# Patient Record
Sex: MALE | Race: WHITE
[De-identification: names, ages, dates, MRNs, and addresses within clinical notes are randomized per-mention and may not be internally consistent; named-entity substitution may affect disease eponyms.]

---

## 2020-07-23 ENCOUNTER — HOSPITAL ENCOUNTER (EMERGENCY)
Dept: HOSPITAL 96 - M.ERS | Age: 20
LOS: 1 days | Discharge: HOME | End: 2020-07-24
Payer: COMMERCIAL

## 2020-07-23 VITALS — HEIGHT: 67 IN | WEIGHT: 189.99 LBS | BODY MASS INDEX: 29.82 KG/M2

## 2020-07-23 DIAGNOSIS — Z85.828: ICD-10-CM

## 2020-07-23 DIAGNOSIS — J45.909: ICD-10-CM

## 2020-07-23 DIAGNOSIS — K52.9: Primary | ICD-10-CM

## 2020-07-23 DIAGNOSIS — R11.2: ICD-10-CM

## 2020-07-23 LAB
ABSOLUTE BASOPHILS: 0.1 THOU/UL (ref 0–0.2)
ABSOLUTE EOSINOPHILS: 0.5 THOU/UL (ref 0–0.7)
ABSOLUTE MONOCYTES: 0.8 THOU/UL (ref 0–1.2)
ALBUMIN SERPL-MCNC: 4.2 G/DL (ref 3.4–5)
ALP SERPL-CCNC: 99 U/L (ref 46–116)
ALT SERPL-CCNC: 113 U/L (ref 30–65)
ANION GAP SERPL CALC-SCNC: 10 MMOL/L (ref 7–16)
AST SERPL-CCNC: 33 U/L (ref 15–37)
BASOPHILS NFR BLD AUTO: 1 %
BILIRUB SERPL-MCNC: 0.3 MG/DL
BILIRUB UR-MCNC: NEGATIVE MG/DL
BUN SERPL-MCNC: 13 MG/DL (ref 7–18)
CALCIUM SERPL-MCNC: 8.6 MG/DL (ref 8.5–10.1)
CHLORIDE SERPL-SCNC: 104 MMOL/L (ref 98–107)
CO2 SERPL-SCNC: 26 MMOL/L (ref 21–32)
COLOR UR: YELLOW
CREAT SERPL-MCNC: 1 MG/DL (ref 0.6–1.3)
EOSINOPHIL NFR BLD: 4.3 %
GLUCOSE SERPL-MCNC: 98 MG/DL (ref 70–99)
GRANULOCYTES NFR BLD MANUAL: 62.8 %
HCT VFR BLD CALC: 43.6 % (ref 42–52)
HGB BLD-MCNC: 15.2 GM/DL (ref 14–18)
KETONES UR STRIP-MCNC: NEGATIVE MG/DL
LIPASE: 92 U/L (ref 73–393)
LYMPHOCYTES # BLD: 2.7 THOU/UL (ref 0.8–5.3)
LYMPHOCYTES NFR BLD AUTO: 24.7 %
MAGNESIUM SERPL-MCNC: 2.2 MG/DL (ref 1.8–2.4)
MCH RBC QN AUTO: 30.5 PG (ref 26–34)
MCHC RBC AUTO-ENTMCNC: 35 G/DL (ref 28–37)
MCV RBC: 87.3 FL (ref 80–100)
MONOCYTES NFR BLD: 7.2 %
MPV: 8.1 FL. (ref 7.2–11.1)
NEUTROPHILS # BLD: 6.7 THOU/UL (ref 1.6–8.1)
NUCLEATED RBCS: 0 /100WBC
PLATELET COUNT*: 256 THOU/UL (ref 150–400)
POTASSIUM SERPL-SCNC: 3.5 MMOL/L (ref 3.5–5.1)
PROT SERPL-MCNC: 7.7 G/DL (ref 6.4–8.2)
PROT UR QL STRIP: NEGATIVE
RBC # BLD AUTO: 5 MIL/UL (ref 4.5–6)
RBC # UR STRIP: NEGATIVE /UL
RDW-CV: 12.4 % (ref 10.5–14.5)
SODIUM SERPL-SCNC: 140 MMOL/L (ref 136–145)
SP GR UR STRIP: >= 1.03 (ref 1–1.03)
URINE CLARITY: CLEAR
URINE GLUCOSE-RANDOM: NEGATIVE
URINE LEUKOCYTES-REFLEX: NEGATIVE
URINE NITRITE-REFLEX: NEGATIVE
UROBILINOGEN UR STRIP-ACNC: 0.2 E.U./DL (ref 0.2–1)
WBC # BLD AUTO: 10.7 THOU/UL (ref 4–11)

## 2020-07-24 VITALS — SYSTOLIC BLOOD PRESSURE: 124 MMHG | DIASTOLIC BLOOD PRESSURE: 71 MMHG

## 2020-07-28 ENCOUNTER — HOSPITAL ENCOUNTER (OUTPATIENT)
Dept: HOSPITAL 96 - M.SUR | Age: 20
Setting detail: OBSERVATION
LOS: 1 days | Discharge: HOME | End: 2020-07-29
Attending: SURGERY | Admitting: SURGERY
Payer: COMMERCIAL

## 2020-07-28 VITALS — DIASTOLIC BLOOD PRESSURE: 75 MMHG | SYSTOLIC BLOOD PRESSURE: 140 MMHG

## 2020-07-28 VITALS — HEIGHT: 67.99 IN | WEIGHT: 190 LBS | BODY MASS INDEX: 28.79 KG/M2

## 2020-07-28 VITALS — DIASTOLIC BLOOD PRESSURE: 78 MMHG | SYSTOLIC BLOOD PRESSURE: 128 MMHG

## 2020-07-28 DIAGNOSIS — K35.80: ICD-10-CM

## 2020-07-28 DIAGNOSIS — Z03.818: Primary | ICD-10-CM

## 2020-07-28 LAB
ABSOLUTE BASOPHILS: 0 THOU/UL (ref 0–0.2)
ABSOLUTE EOSINOPHILS: 0.4 THOU/UL (ref 0–0.7)
ABSOLUTE MONOCYTES: 0.6 THOU/UL (ref 0–1.2)
ALBUMIN SERPL-MCNC: 4.3 G/DL (ref 3.4–5)
ALP SERPL-CCNC: 103 U/L (ref 46–116)
ALT SERPL-CCNC: 100 U/L (ref 30–65)
ANION GAP SERPL CALC-SCNC: 8 MMOL/L (ref 7–16)
AST SERPL-CCNC: 29 U/L (ref 15–37)
BASOPHILS NFR BLD AUTO: 0.3 %
BILIRUB SERPL-MCNC: 0.3 MG/DL
BILIRUB UR-MCNC: NEGATIVE MG/DL
BUN SERPL-MCNC: 10 MG/DL (ref 7–18)
CALCIUM SERPL-MCNC: 8.8 MG/DL (ref 8.5–10.1)
CHLORIDE SERPL-SCNC: 105 MMOL/L (ref 98–107)
CO2 SERPL-SCNC: 28 MMOL/L (ref 21–32)
COLOR UR: YELLOW
CREAT SERPL-MCNC: 1 MG/DL (ref 0.6–1.3)
EOSINOPHIL NFR BLD: 3.6 %
GLUCOSE SERPL-MCNC: 124 MG/DL (ref 70–99)
GRANULOCYTES NFR BLD MANUAL: 69.4 %
HCT VFR BLD CALC: 43.7 % (ref 42–52)
HGB BLD-MCNC: 15.5 GM/DL (ref 14–18)
KETONES UR STRIP-MCNC: NEGATIVE MG/DL
LIPASE: 92 U/L (ref 73–393)
LYMPHOCYTES # BLD: 2 THOU/UL (ref 0.8–5.3)
LYMPHOCYTES NFR BLD AUTO: 20.2 %
MAGNESIUM SERPL-MCNC: 2.2 MG/DL (ref 1.8–2.4)
MCH RBC QN AUTO: 30.8 PG (ref 26–34)
MCHC RBC AUTO-ENTMCNC: 35.4 G/DL (ref 28–37)
MCV RBC: 87.1 FL (ref 80–100)
MONOCYTES NFR BLD: 6.5 %
MPV: 8.2 FL. (ref 7.2–11.1)
NEUTROPHILS # BLD: 6.8 THOU/UL (ref 1.6–8.1)
NUCLEATED RBCS: 0 /100WBC
PLATELET COUNT*: 255 THOU/UL (ref 150–400)
POTASSIUM SERPL-SCNC: 3.5 MMOL/L (ref 3.5–5.1)
PROT SERPL-MCNC: 7.8 G/DL (ref 6.4–8.2)
PROT UR QL STRIP: NEGATIVE
RBC # BLD AUTO: 5.02 MIL/UL (ref 4.5–6)
RBC # UR STRIP: NEGATIVE /UL
RDW-CV: 12.6 % (ref 10.5–14.5)
SODIUM SERPL-SCNC: 141 MMOL/L (ref 136–145)
SP GR UR STRIP: 1.02 (ref 1–1.03)
URINE CLARITY: CLEAR
URINE GLUCOSE-RANDOM: NEGATIVE
URINE LEUKOCYTES-REFLEX: NEGATIVE
URINE NITRITE-REFLEX: NEGATIVE
UROBILINOGEN UR STRIP-ACNC: 0.2 E.U./DL (ref 0.2–1)
WBC # BLD AUTO: 9.8 THOU/UL (ref 4–11)

## 2020-07-29 VITALS — SYSTOLIC BLOOD PRESSURE: 144 MMHG | DIASTOLIC BLOOD PRESSURE: 79 MMHG

## 2020-07-29 VITALS — DIASTOLIC BLOOD PRESSURE: 83 MMHG | SYSTOLIC BLOOD PRESSURE: 128 MMHG

## 2020-07-31 NOTE — PATH
[facility] 
[address] [place]  [address]                    PATHOLOGY RPT PROCEDURE       
_______________________________________________________________________________
 
Name:       GEE KAISER           Room:           [address] Nichole DAVIS#:  [id number]      Account #:      [id number]  
Admission:  07/29/20     Date of Birth:  [birth year]  
Discharge:  07/29/20                   Report #:    [id number]
                                                         Path Case #: [id number]
_______________________________________________________________________________
 
L[facility] Accession Number: [id number]
.                                                                01
Material submitted:                                        .
appendix - APPENDIX
.                                                                01
Clinical history:                                          .
appendicitis
.                                                                02
**********************************************************************
Diagnosis:
Vermiform appendix, excision:
 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
    inflammation.
 - Negative for malignancy.
.
(MLK:mml; 07/30/2020)
QL  07/31/2020  1107 Local
**********************************************************************
.                                                                02
Electronically signed:                                     .
Nichelle Benavidez MD, Pathologist
NPI- [id number]
.                                                                01
Gross description:                                         .
The specimen is received in formalin, labeled "Gee Kaiser",
"appendix".  Received is a vermiform appendix which measures 7.2 cm in
length and up to 1.1 cm in diameter.  The serosa surface is wrinkled,
shiny and pale tan-gray.  Sectioning reveals a partially patent lumen with
a dilated distal tip.  The tip is filled with a multicystic gelatinous
irregularity that measures 1.8 cm in size.  No distinct solid masses or
nodules are identified.  Representative sections are submitted in
cassettes A1 to A3, with the cystic area entirely submitted in cassettes
A2 and A3.(SNA; 7/29/2020)
WOODY/MARTHA  07/29/2020  1730 Local
.                                                                02
Pathologist provided ICD-10:
K35.80, K36
.                                                                02
CPT                                                        .
120039
Specimen Comment: A courtesy copy of this report has been sent to [phone]
Specimen Comment: Report sent to 
***Performed at:  01
   [address]  [id number]
   MD Nic Valentine MD Phone:  [phone]
***Performed at:  02
   [place] [address]                    PATHOLOGY RPT PROCEDURE       
_______________________________________________________________________________
 
Name:       GEE KAISER           Room:           [address] 
M.RRickey#:  [id number]      Account #:      [id number]  
Admission:  07/29/20     Date of Birth:  [birth year]  
Discharge:  07/29/20                   Report #:    [id number]
                                                         Path Case #: [id number]
_______________________________________________________________________________
   [address] [place]  [id number]
   MD Ronald Anand MD Phone:  [phone]

## 2020-08-01 NOTE — OP
[address]  [address]                    OPERATIVE REPORT              
_______________________________________________________________________________
 
Name:       SHYANN KAISER           Room:           [address] Nichole DAVIS#:  [id number]      Account #:      [id number]  
Admission:  07/29/20     Attend Phys:    Barrie Horn DO   
Discharge:  07/29/20     Date of Birth:  [birth year]  
         Report #: [id number]
                                                                     [id number]  
_______________________________________________________________________________
THIS REPORT FOR:  //name//                      
 
cc:  ДМИТРИЙ Arndt family physician/PCP 
     ДМИТРИЙ Arndt family physician/PCP                                        ~
THIS REPORT FOR:  //name//                      
 
CC: Barrie CHOE physician/PCP
 
DATE OF SERVICE:  07/28/2020
 
 
PREOPERATIVE DIAGNOSIS:  Acute appendicitis.
 
POSTOPERATIVE DIAGNOSIS:  Acute appendicitis.
 
PROCEDURE:  Laparoscopic appendectomy.
 
SURGEON:  Barrie Horn DO
 
ASSISTANT:  Roland Robert DO, PGY-1, resident.
 
ANESTHESIA:  General endotracheal.
 
ESTIMATED BLOOD LOSS:  Less than 20 mL.
 
COMPLICATIONS:  None.
 
DESCRIPTION OF PROCEDURE:  After obtaining proper consents and discussing risks
and complications with the patient, he was taken to the operating room, laid in
the supine position, administered general endotracheal anesthetic.  He was then
prepped and draped in the usual sterile fashion.  A timeout was performed.  We
confirmed the appropriate patient and procedure.  Preoperative antibiotics had
been given.  SCDs were in place.  We then made a small supraumbilical skin
incision with #11 scalpel blade.  This was carried down through the skin into
the subcutaneous tissue using electrocautery for hemostasis.  Once the fascia
was encountered, it was incised along the midline, grasped and elevated with
Kocher clamps and divided further.  The peritoneum was then bluntly opened using
a hemostat.  A finger was placed inside the peritoneal cavity to assure that
there were no yosvany-incisional adhesions.  Next, 2-0 Vicryl sutures were placed
in a figure-of-eight fashion to secure the Lilian trocar, which was then
inserted and insufflation was begun.  Once insufflation was complete, full
visual inspection of the anterior abdominal organs was performed.  This revealed
no significant gross abnormalities.  Immediately, there was a large amount of
omental fat.  We then placed the patient in Trendelenburg position.  A 5 mm
trocar was placed in the suprapubic position, a 12 mm trocar was placed in the
 
 
 
[place] [address]                    OPERATIVE REPORT              
_______________________________________________________________________________
 
Name:       SHYANN KAISER ALAN           [address]           [address]  [id number]      Account #:      [id number]  
Admission:  07/29/20     Attend Phys:    Barrie Horn DO   
Discharge:  07/29/20     Date of Birth:  [birth year]  
         Report #: [id number]
                                                                     [id number]  
_______________________________________________________________________________
left lower quadrant.  I was then able to identify the cecum and then identified
the appendix, which was noted to be markedly inflamed, especially at the distal
aspect, I used blunt dissection as well as the Harmonic scalpel to sequentially
clamp and divide the mesoappendix.  The appendix was then attached only at its
base.  Once it was attached only at its base and we could see the confluence of
the tenia coli, I used an Endo-STEPHEN 45 mm purple load to divide the base of the
appendix.  We then placed the appendix into an Endopouch.  I checked the
appendiceal stump and mesoappendiceal stump for any leak or bleeding, there was
none identified.  We then placed the patient back into the normal supine
position.  I removed the left lower quadrant trocar and the site was closed
using a PMI closure device with 0 Vicryl suture to close the fascia.  The
remaining trocars were then removed under direct vision and the insufflation was
all released.  We then closed the umbilical fascia using the 2 previously placed
0 Vicryl sutures plus 2 additional 0 Vicryl sutures.  I then closed the
subcutaneous tissue of the umbilical incision with a 3-0 Vicryl suture.  Skin
incisions were all closed using 4-0 Monocryl subcuticular stitches.  Dermabond
was placed.  The wounds were injected with 0.5% Marcaine without epinephrine. 
The patient was then awakened in the operating room and transported to recovery
room in stable condition.
 
 
 
 
 
 
 
 
 
 
 
 
 
 
 
 
 
 
 
 
 
 
 
 
 
<ELECTRONICALLY SIGNED>
                                        By:  Barrie Horn DO            
08/01/20     0937
D: 07/29/20 0121_______________________________________
T: 07/29/20 0142Anina Horn DO               /nt

## 2021-01-14 ENCOUNTER — HOSPITAL ENCOUNTER (EMERGENCY)
Dept: HOSPITAL 96 - M.ERS | Age: 21
Discharge: HOME | End: 2021-01-14
Payer: COMMERCIAL

## 2021-01-14 VITALS — WEIGHT: 200 LBS | BODY MASS INDEX: 30.31 KG/M2 | HEIGHT: 68 IN

## 2021-01-14 VITALS — DIASTOLIC BLOOD PRESSURE: 63 MMHG | SYSTOLIC BLOOD PRESSURE: 115 MMHG

## 2021-01-14 DIAGNOSIS — K52.9: Primary | ICD-10-CM

## 2021-01-14 DIAGNOSIS — J45.909: ICD-10-CM

## 2021-01-14 LAB
ABSOLUTE BASOPHILS: 0.1 THOU/UL (ref 0–0.2)
ABSOLUTE EOSINOPHILS: 0.4 THOU/UL (ref 0–0.7)
ABSOLUTE MONOCYTES: 0.9 THOU/UL (ref 0–1.2)
ALBUMIN SERPL-MCNC: 4.9 G/DL (ref 3.4–5)
ALP SERPL-CCNC: 102 U/L (ref 46–116)
ALT SERPL-CCNC: 129 U/L (ref 30–65)
ANION GAP SERPL CALC-SCNC: 12 MMOL/L (ref 7–16)
AST SERPL-CCNC: 31 U/L (ref 15–37)
BASOPHILS NFR BLD AUTO: 0.7 %
BILIRUB SERPL-MCNC: 0.4 MG/DL
BILIRUB UR-MCNC: NEGATIVE MG/DL
BUN SERPL-MCNC: 18 MG/DL (ref 7–18)
CALCIUM SERPL-MCNC: 9.3 MG/DL (ref 8.5–10.1)
CHLORIDE SERPL-SCNC: 104 MMOL/L (ref 98–107)
CO2 SERPL-SCNC: 25 MMOL/L (ref 21–32)
COLOR UR: YELLOW
CREAT SERPL-MCNC: 0.9 MG/DL (ref 0.6–1.3)
EOSINOPHIL NFR BLD: 3.5 %
GLUCOSE SERPL-MCNC: 89 MG/DL (ref 70–99)
GRANULOCYTES NFR BLD MANUAL: 69.2 %
HCT VFR BLD CALC: 47.2 % (ref 42–52)
HGB BLD-MCNC: 16.4 GM/DL (ref 14–18)
KETONES UR STRIP-MCNC: NEGATIVE MG/DL
LIPASE: 93 U/L (ref 73–393)
LYMPHOCYTES # BLD: 2 THOU/UL (ref 0.8–5.3)
LYMPHOCYTES NFR BLD AUTO: 18.7 %
MCH RBC QN AUTO: 30.2 PG (ref 26–34)
MCHC RBC AUTO-ENTMCNC: 34.9 G/DL (ref 28–37)
MCV RBC: 86.5 FL (ref 80–100)
MONOCYTES NFR BLD: 7.9 %
MPV: 8.1 FL. (ref 7.2–11.1)
NEUTROPHILS # BLD: 7.5 THOU/UL (ref 1.6–8.1)
NUCLEATED RBCS: 0 /100WBC
PLATELET COUNT*: 300 THOU/UL (ref 150–400)
POTASSIUM SERPL-SCNC: 4 MMOL/L (ref 3.5–5.1)
PROT SERPL-MCNC: 8.7 G/DL (ref 6.4–8.2)
PROT UR QL STRIP: NEGATIVE
RBC # BLD AUTO: 5.45 MIL/UL (ref 4.5–6)
RBC # UR STRIP: NEGATIVE /UL
RDW-CV: 12.6 % (ref 10.5–14.5)
SODIUM SERPL-SCNC: 141 MMOL/L (ref 136–145)
SP GR UR STRIP: >= 1.03 (ref 1–1.03)
URINE CLARITY: CLEAR
URINE GLUCOSE-RANDOM: NEGATIVE
URINE LEUKOCYTES-REFLEX: NEGATIVE
URINE NITRITE-REFLEX: NEGATIVE
UROBILINOGEN UR STRIP-ACNC: 0.2 E.U./DL (ref 0.2–1)
WBC # BLD AUTO: 10.8 THOU/UL (ref 4–11)